# Patient Record
Sex: MALE | Race: WHITE | Employment: PART TIME | URBAN - METROPOLITAN AREA
[De-identification: names, ages, dates, MRNs, and addresses within clinical notes are randomized per-mention and may not be internally consistent; named-entity substitution may affect disease eponyms.]

---

## 2024-05-18 ENCOUNTER — OFFICE VISIT (OUTPATIENT)
Dept: FAMILY MEDICINE CLINIC | Facility: CLINIC | Age: 34
End: 2024-05-18

## 2024-05-18 VITALS
DIASTOLIC BLOOD PRESSURE: 85 MMHG | SYSTOLIC BLOOD PRESSURE: 117 MMHG | TEMPERATURE: 98 F | HEART RATE: 73 BPM | RESPIRATION RATE: 16 BRPM | OXYGEN SATURATION: 97 %

## 2024-05-18 DIAGNOSIS — L72.9 SCALP CYST: Primary | ICD-10-CM

## 2024-05-18 PROCEDURE — 99202 OFFICE O/P NEW SF 15 MIN: CPT | Performed by: FAMILY MEDICINE

## 2024-05-18 RX ORDER — CEPHALEXIN 500 MG/1
500 CAPSULE ORAL 2 TIMES DAILY
Qty: 14 CAPSULE | Refills: 0 | Status: SHIPPED | OUTPATIENT
Start: 2024-05-18 | End: 2024-05-25

## 2024-05-18 NOTE — PATIENT INSTRUCTIONS
Take antibiotics with food and plenty of water.   Eat yogurt or take probiotic daily. (Align is a good example of an OTC probiotic)  Make sure to finish the entire antibiotic treatment.  Increase fluids and rest.   Use warm compresses to the area as able-- 10-15 minutes at a time, 2-3 times daily. This can help reduce swelling and allow blood flow to the area, which can help the antibiotic get to to the place it needs to.   Follow-up with Dermatology as planned for further evaluation and discussion on removal or long-term treatment plan.

## 2024-05-18 NOTE — PROGRESS NOTES
CHIEF COMPLAINT:     Chief Complaint   Patient presents with    Skin Problem     Suspected cyst located on forehead, started 2 months ago, worsening 5 days ago, swelling and tenderness increasing       HPI:     Martin Cortés IV is a 33 year old male who presents with concerns of inflamed lump on right frontal scalp. Patient first noticed symptoms 2 months ago when he noted a painless lump on the right upper forehead at the scalp line. But 5 days ago noted increased pain, swelling and redness.  Reports erythema, increased warmth, some tenderness to palpation. Denies drainage from area.   Precipitating event: none known.  Treatments: none.  No other associated symptoms.  Denies fever, streaking of wound, or other signs of systemic illness.       Current Outpatient Medications   Medication Sig Dispense Refill    cephalexin (KEFLEX) 500 MG Oral Cap Take 1 capsule (500 mg total) by mouth 2 (two) times daily for 7 days. 14 capsule 0    VENTOLIN  (90 Base) MCG/ACT Inhalation Aero Soln INHALE 2 PUFFS INTO THE LUNGS 4 TIMES A DAY AS NEEDED  18 g 2    Montelukast Sodium 10 MG Oral Tab TAKE 1 TABLET (10 MG TOTAL) BY MOUTH DAILY. 30 tablet 11     No current facility-administered medications for this visit.      Past Medical History:    Depression    Extrinsic asthma, unspecified    Pneumonia, organism unspecified    Psychiatric disorder      Social History:  Social History     Socioeconomic History    Marital status: Single   Tobacco Use    Smoking status: Every Day     Current packs/day: 0.15     Types: Cigarettes    Smokeless tobacco: Never   Substance and Sexual Activity    Alcohol use: No     Alcohol/week: 0.0 standard drinks of alcohol    Drug use: Yes     Comment: marijuana   Social History Narrative    ** Merged History Encounter **             REVIEW OF SYSTEMS:   GENERAL: feels well otherwise, no fever, no chills.  SKIN: as above.  CHEST: no chest pains, no palpitations.  LUNGS: denies shortness of breath  with exertion or rest. No wheezing, no cough.  LYMPH: no enlargement of the lymph nodes.  MUSC/SKEL: no joint swelling, no joint stiffness.  NEURO: no abnormal sensation, no tingling of the skin or numbness.    EXAM:   /85   Pulse 73   Temp 97.6 °F (36.4 °C)   Resp 16   SpO2 97%   GENERAL: well developed, well nourished,in no apparent distress  SKIN: right frontal scalp line with 1.5 cm mildly erythematous, tender, slightly edematous cystic lesion with mild local swelling. No punctum or discharge noted.   HEAD: atraumatic, normocephalic  LUNGS: clear to auscultation bilaterally, no wheezes or rhonchi. Breathing is non labored.  CARDIO: RRR without murmur  LYMPH: no lymphadenopathy.      ASSESSMENT AND PLAN:     ASSESSMENT:  Encounter Diagnosis   Name Primary?    Scalp cyst Yes       PLAN: Skin care discussed with patient. Instructions and Comfort Care as listed in Patient Instructions.  Medication as below.    Requested Prescriptions     Signed Prescriptions Disp Refills    cephalexin (KEFLEX) 500 MG Oral Cap 14 capsule 0     Sig: Take 1 capsule (500 mg total) by mouth 2 (two) times daily for 7 days.       Patient Instructions   Take antibiotics with food and plenty of water.   Eat yogurt or take probiotic daily. (Align is a good example of an OTC probiotic)  Make sure to finish the entire antibiotic treatment.  Increase fluids and rest.   Use warm compresses to the area as able-- 10-15 minutes at a time, 2-3 times daily. This can help reduce swelling and allow blood flow to the area, which can help the antibiotic get to to the place it needs to.   Follow-up with Dermatology as planned for further evaluation and discussion on removal or long-term treatment plan.      The patient indicates understanding of these issues and agrees to the plan.  The patient is asked to return in 2-3 days if sx's persist or worsen.